# Patient Record
Sex: FEMALE | Race: WHITE | Employment: FULL TIME | URBAN - METROPOLITAN AREA
[De-identification: names, ages, dates, MRNs, and addresses within clinical notes are randomized per-mention and may not be internally consistent; named-entity substitution may affect disease eponyms.]

---

## 2018-01-26 ENCOUNTER — HOSPITAL ENCOUNTER (EMERGENCY)
Facility: CLINIC | Age: 53
Discharge: HOME | End: 2018-01-26
Attending: PHYSICIAN ASSISTANT | Admitting: PHYSICIAN ASSISTANT

## 2018-01-26 ENCOUNTER — APPOINTMENT (OUTPATIENT)
Dept: CT IMAGING | Facility: CLINIC | Age: 53
End: 2018-01-26
Attending: PHYSICIAN ASSISTANT

## 2018-01-26 VITALS
HEIGHT: 69 IN | OXYGEN SATURATION: 98 % | WEIGHT: 170 LBS | TEMPERATURE: 98 F | HEART RATE: 65 BPM | SYSTOLIC BLOOD PRESSURE: 142 MMHG | BODY MASS INDEX: 25.18 KG/M2 | RESPIRATION RATE: 16 BRPM | DIASTOLIC BLOOD PRESSURE: 57 MMHG

## 2018-01-26 DIAGNOSIS — S16.1XXA NECK STRAIN, INITIAL ENCOUNTER: ICD-10-CM

## 2018-01-26 DIAGNOSIS — S09.90XA CLOSED HEAD INJURY, INITIAL ENCOUNTER: ICD-10-CM

## 2018-01-26 PROCEDURE — 72125 CT NECK SPINE W/O DYE: CPT

## 2018-01-26 PROCEDURE — 70450 CT HEAD/BRAIN W/O DYE: CPT

## 2018-01-26 PROCEDURE — 99284 EMERGENCY DEPT VISIT MOD MDM: CPT | Mod: 25

## 2018-01-26 ASSESSMENT — ENCOUNTER SYMPTOMS
WEAKNESS: 0
NECK PAIN: 1
HEADACHES: 1
PHOTOPHOBIA: 0
VOMITING: 0
DIZZINESS: 0
SPEECH DIFFICULTY: 0
LIGHT-HEADEDNESS: 0
COLOR CHANGE: 0
BACK PAIN: 0
NAUSEA: 0
WOUND: 0
NUMBNESS: 0
MYALGIAS: 0

## 2018-01-26 NOTE — ED AVS SNAPSHOT
Emergency Department    [address] [facility] [address]    Phone:  [phone]    Fax:  [phone]                                       La Saleem   MRN: [id number]    Department:   Emergency Department   Date of Visit:  1/26/2018           Patient Information     Date Of Birth          1965        Your diagnoses for this visit were:     Closed head injury, initial encounter     Neck strain, initial encounter        You were seen by Ambika Zaldivar PA-C.      Follow-up Information     Follow up with Jerman Russell MD In 3 days.    Specialty:  Family Practice    Why:  As needed    Contact information:    [address]  [address]  [facility] [address] [phone]          Follow up with  Emergency Department.    Specialty:  EMERGENCY MEDICINE    Why:  If symptoms worsen    Contact information:    [address] [facility] [id number] [phone]        Discharge Instructions       Discharge Instructions  Head Injury    You have been seen today for a head injury. Your evaluation included a history and physical examination. You may have had a CT (CAT) scan performed, though most head injuries do not require a scan. Based on this evaluation, your provider today does not feel that your head injury is serious.    Generally, every Emergency Department visit should have a follow-up clinic visit with either a primary or a specialty clinic/provider. Please follow-up as instructed by your emergency provider today.  Return to the Emergency Department if:    You are confused or you are not acting right.    Your headache gets worse or you start to have a really bad headache even with your recommended treatment plan.    You vomit (throw up) more than once.    You have a seizure.    You have trouble walking.    You have weakness or paralysis (cannot move) in an arm or a leg.    You have blood or fluid coming from your ears or nose.    You have new symptoms or  anything that worries you.    Sleeping:  It is okay for you to sleep, but someone should wake you up if instructed by your provider, and someone should check on you at your usual time to wake up.     Activity:    Do not drive for at least 24 hours.    Do not drive if you have dizzy spells or trouble concentrating, or remembering things.    Do not return to any contact sports until cleared by your regular provider.     MORE INFORMATION:    Concussion:  A concussion is a minor head injury that may cause temporary problems with the way the brain works. Although concussions are important, they are generally not an emergency or a reason that a person needs to be hospitalized. Some concussion symptoms include confusion, amnesia (forgetful), nausea (sick to your stomach) and vomiting (throwing up), dizziness, fatigue, memory or concentration problems, irritability and sleep problems. For most people, concussions are mild and temporary but some will have more severe and persistent symptoms that require on-going care and treatment.  CT Scans: Your evaluation today may have included a CT scan (CAT scan) to look for things like bleeding or a skull fracture (broken bone).  CT scans involve radiation and too many CT scans can cause serious health problems like cancer, especially in children.  Because of this, your provider may not have ordered a CT scan today if they think you are at low risk for a serious or life threatening problem.    If you were given a prescription for medicine here today, be sure to read all of the information (including the package insert) that comes with your prescription.  This will include important information about the medicine, its side effects, and any warnings that you need to know about.  The pharmacist who fills the prescription can provide more information and answer questions you may have about the medicine.  If you have questions or concerns that the pharmacist cannot address, please call or  return to the Emergency Department.     Remember that you can always come back to the Emergency Department if you are not able to see your regular provider in the amount of time listed above, if you get any new symptoms, or if there is anything that worries you.      Neck Sprain or Strain  A sudden force that causes turning or bending of the neck can cause sprain or strain. An example would be the force from a car accident. This can stretch or tear muscles called a strain. It can also stretch or tear ligaments called a sprain. Either of these can cause neck pain. Sometimes neck pain occurs after a simple awkward movement. In either case, muscle spasm is commonly present and contributes to the pain.     Unless you had a forceful physical injury (for example, a car accident or fall), X-rays are usually not ordered for the initial evaluation of neck pain. If pain continues and dose not respond to medical treatment, X-rays and other tests may be performed at a later time.  Home care    You may feel more soreness and spasm the first few days after the injury. Rest until symptoms begin to improve.    When lying down, use a comfortable pillow or a rolled towel that supports the head and keeps the spine in a neutral position. The position of the head should not be tilted forward or backward.    Apply an ice pack over the injured area for 15 to 20 minutes every 3 to 6 hours. You should do this for the first 24 to 48 hours. You can make an ice pack by filling a plastic bag that seals at the top with ice cubes and then wrapping it with a thin towel. After 48 hours, apply heat (warm shower or warm bath) for 15 to 20 minutes several times a day, or alternate ice and heat.    You may use over-the-counter pain medicine to control pain, unless another pain medicine was prescribed. If you have chronic liver or kidney disease or ever had a stomach ulcer or GI bleeding, talk with your healthcare provider before using these  medicines.    If a soft cervical collar was prescribed, it should be worn only for periods of increased pain. It should not be worn for more than 3 hours a day, or for a period longer than 1 to 2 weeks.  Follow-up care  Follow up with your healthcare provider as directed. Physical therapy may be needed.  Sometimes fractures don t show up on the first X-ray. Bruises and sprains can sometimes hurt as much as a fracture. These injuries can take time to heal completely. If your symptoms don t improve or they get worse, talk with your healthcare provider. You may need a repeat X-ray or other tests. If X-rays were taken, you will be told of any new findings that may affect your care.  Call 911  Call 911 if you have:    Neck swelling, difficulty or painful swallowing    Difficulty breathing    Chest pain  When to seek medical advice  Call your healthcare provider right away if any of these occur:    Pain becomes worse or spreads into your arms    Weakness or numbness in one or both arms  Date Last Reviewed: 11/19/2015 2000-2017 [employer]. [address] [place] [address]. All rights reserved. This information is not intended as a substitute for professional medical care. Always follow your healthcare professional's instructions.            24 Hour Appointment Hotline       To make an appointment at any [facility], call [phone] ([phone]). If you don't have a family doctor or clinic, we will help you find one. [facility] clinics are conveniently located to serve the needs of you and your family.             Review of your medicines      Our records show that you are taking the medicines listed below. If these are incorrect, please call your family doctor or clinic.        Dose / Directions Last dose taken    ibuprofen 200 MG tablet   Commonly known as:  ADVIL/MOTRIN   Dose:  200 mg        Take 200 mg by mouth every 4 hours as needed for mild pain   Refills:  0        METHIMAZOLE  PO        Refills:  0        MULTIVITAMIN ADULT PO        Take by mouth daily as needed   Refills:  0                Procedures and tests performed during your visit     CT Head w/o Contrast    Cervical spine CT w/o contrast      Orders Needing Specimen Collection     None      Pending Results     Date and Time Order Name Status Description    1/26/2018 1948 CT Head w/o Contrast Preliminary     1/26/2018 1948 Cervical spine CT w/o contrast Preliminary             Pending Culture Results     No orders found from 1/24/2018 to 1/27/2018.            Pending Results Instructions     If you had any lab results that were not finalized at the time of your Discharge, you can call the ED Lab Result RN at [phone]. You will be contacted by this team for any positive Lab results or changes in treatment. The nurses are available 7 days a week from 10A to 6:30P.  You can leave a message 24 hours per day and they will return your call.        Test Results From Your Hospital Stay        1/26/2018  9:28 PM      Narrative     CT CERVICAL SPINE WITHOUT CONTRAST   1/26/2018  9:02 PM     HISTORY: Shelf fell onto head and neck, assess for fracture, shift  fell onto head and neck.      TECHNIQUE: Axial images of the cervical spine were obtained without  intravenous contrast. Multiplanar reformations were performed.  Radiation dose for this scan was reduced using automated exposure  control, adjustment of the mA and/or kV according to patient size, or  iterative reconstruction technique.     COMPARISON: 6/3/2014    FINDINGS: There is no evidence of fracture.    Alignment: Normal.      Craniocervical junction: Normal.     C1-C2:  Normal.     C2-C3:  Normal disc, facet joints, spinal canal and neural foramina.     C3-C4:  Normal disc, facet joints, spinal canal and neural foramina.     C4-C5:  Normal disc, facet joints, spinal canal and neural foramina.     C5-C6:  Normal disc, facet joints, spinal canal and neural foramina.      C6-C7:   Normal disc, facet joints, spinal canal and neural foramina.      C7-T1:   Normal disc, facet joints, spinal canal and neural foramina.        Impression     IMPRESSION:  Normal CT scan of the cervical spine.         1/26/2018  9:16 PM      Narrative     CT SCAN OF THE HEAD WITHOUT CONTRAST   1/26/2018 9:02 PM     HISTORY: Heavy shelf hit head and neck.    TECHNIQUE: Axial images of the head and coronal reformations without  IV contrast material.  Radiation dose for this scan was reduced using  automated exposure control, adjustment of the mA and/or kV according  to patient size, or iterative reconstruction technique.    COMPARISON: 12/31/2003    FINDINGS:  The ventricles are normal in size, shape and configuration.   The brain parenchyma and subarachnoid spaces are normal. There is no  evidence of intracranial hemorrhage, mass, acute infarct or anomaly.     The visualized portions of the sinuses and mastoids appear normal.  There is no evidence of trauma.        Impression     IMPRESSION: Normal CT scan of the head.                Clinical Quality Measure: Blood Pressure Screening     Your blood pressure was checked while you were in the emergency department today. The last reading we obtained was  BP: 142/57 . Please read the guidelines below about what these numbers mean and what you should do about them.  If your systolic blood pressure (the top number) is less than 120 and your diastolic blood pressure (the bottom number) is less than 80, then your blood pressure is normal. There is nothing more that you need to do about it.  If your systolic blood pressure (the top number) is 120-139 or your diastolic blood pressure (the bottom number) is 80-89, your blood pressure may be higher than it should be. You should have your blood pressure rechecked within a year by a primary care provider.  If your systolic blood pressure (the top number) is 140 or greater or your diastolic blood pressure (the bottom number) is 90  "or greater, you may have high blood pressure. High blood pressure is treatable, but if left untreated over time it can put you at risk for heart attack, stroke, or kidney failure. You should have your blood pressure rechecked by a primary care provider within the next 4 weeks.  If your provider in the emergency department today gave you specific instructions to follow-up with your doctor or provider even sooner than that, you should follow that instruction and not wait for up to 4 weeks for your follow-up visit.        Thank you for choosing [facility]       Thank you for choosing [facility] for your care. Our goal is always to provide you with excellent care. Hearing back from our patients is one way we can continue to improve our services. Please take a few minutes to complete the written survey that you may receive in the mail after you visit with us. Thank you!        [employer] Information     [employer] lets you send messages to your doctor, view your test results, renew your prescriptions, schedule appointments and more. To sign up, go to [email or web address] . Click on \"Log in\" on the left side of the screen, which will take you to the Welcome page. Then click on \"Sign up Now\" on the right side of the page.     You will be asked to enter the access code listed below, as well as some personal information. Please follow the directions to create your username and password.     Your access code is: [email or web address]  Expires: 2018  9:41 PM     Your access code will  in 90 days. If you need help or a new code, please call your [facility] clinic or [phone].        Care EveryWhere ID     This is your Care EveryWhere ID. This could be used by other organizations to access your [facility] medical records  [id number]        Equal Access to Services     ELIO MARTINEZ AH: annelise Cabezas, jimmy blanton. So soledad " [phone].    ATENCIÓN: Si habla español, tiene a morgan disposición servicios gratuitos de asistencia lingüística. Llame al [phone].    We comply with applicable federal civil rights laws and [place] laws. We do not discriminate on the basis of race, color, national origin, age, disability, sex, sexual orientation, or gender identity.            After Visit Summary       This is your record. Keep this with you and show to your community pharmacist(s) and doctor(s) at your next visit.

## 2018-01-26 NOTE — ED AVS SNAPSHOT
Emergency Department    [address] [place] [address]    Phone:  [phone]    Fax:  [phone]                                       La Saleem   MRN: [id number]    Department:   Emergency Department   Date of Visit:  1/26/2018           After Visit Summary Signature Page     I have received my discharge instructions, and my questions have been answered. I have discussed any challenges I see with this plan with the nurse or doctor.    ..........................................................................................................................................  Patient/Patient Representative Signature      ..........................................................................................................................................  Patient Representative Print Name and Relationship to Patient    ..................................................               ................................................  Date                                            Time    ..........................................................................................................................................  Reviewed by Signature/Title    ...................................................              ..............................................  Date                                                            Time

## 2018-01-27 NOTE — DISCHARGE INSTRUCTIONS
Discharge Instructions  Head Injury    You have been seen today for a head injury. Your evaluation included a history and physical examination. You may have had a CT (CAT) scan performed, though most head injuries do not require a scan. Based on this evaluation, your provider today does not feel that your head injury is serious.    Generally, every Emergency Department visit should have a follow-up clinic visit with either a primary or a specialty clinic/provider. Please follow-up as instructed by your emergency provider today.  Return to the Emergency Department if:    You are confused or you are not acting right.    Your headache gets worse or you start to have a really bad headache even with your recommended treatment plan.    You vomit (throw up) more than once.    You have a seizure.    You have trouble walking.    You have weakness or paralysis (cannot move) in an arm or a leg.    You have blood or fluid coming from your ears or nose.    You have new symptoms or anything that worries you.    Sleeping:  It is okay for you to sleep, but someone should wake you up if instructed by your provider, and someone should check on you at your usual time to wake up.     Activity:    Do not drive for at least 24 hours.    Do not drive if you have dizzy spells or trouble concentrating, or remembering things.    Do not return to any contact sports until cleared by your regular provider.     MORE INFORMATION:    Concussion:  A concussion is a minor head injury that may cause temporary problems with the way the brain works. Although concussions are important, they are generally not an emergency or a reason that a person needs to be hospitalized. Some concussion symptoms include confusion, amnesia (forgetful), nausea (sick to your stomach) and vomiting (throwing up), dizziness, fatigue, memory or concentration problems, irritability and sleep problems. For most people, concussions are mild and temporary but some will have more  severe and persistent symptoms that require on-going care and treatment.  CT Scans: Your evaluation today may have included a CT scan (CAT scan) to look for things like bleeding or a skull fracture (broken bone).  CT scans involve radiation and too many CT scans can cause serious health problems like cancer, especially in children.  Because of this, your provider may not have ordered a CT scan today if they think you are at low risk for a serious or life threatening problem.    If you were given a prescription for medicine here today, be sure to read all of the information (including the package insert) that comes with your prescription.  This will include important information about the medicine, its side effects, and any warnings that you need to know about.  The pharmacist who fills the prescription can provide more information and answer questions you may have about the medicine.  If you have questions or concerns that the pharmacist cannot address, please call or return to the Emergency Department.     Remember that you can always come back to the Emergency Department if you are not able to see your regular provider in the amount of time listed above, if you get any new symptoms, or if there is anything that worries you.      Neck Sprain or Strain  A sudden force that causes turning or bending of the neck can cause sprain or strain. An example would be the force from a car accident. This can stretch or tear muscles called a strain. It can also stretch or tear ligaments called a sprain. Either of these can cause neck pain. Sometimes neck pain occurs after a simple awkward movement. In either case, muscle spasm is commonly present and contributes to the pain.     Unless you had a forceful physical injury (for example, a car accident or fall), X-rays are usually not ordered for the initial evaluation of neck pain. If pain continues and dose not respond to medical treatment, X-rays and other tests may be performed  at a later time.  Home care    You may feel more soreness and spasm the first few days after the injury. Rest until symptoms begin to improve.    When lying down, use a comfortable pillow or a rolled towel that supports the head and keeps the spine in a neutral position. The position of the head should not be tilted forward or backward.    Apply an ice pack over the injured area for 15 to 20 minutes every 3 to 6 hours. You should do this for the first 24 to 48 hours. You can make an ice pack by filling a plastic bag that seals at the top with ice cubes and then wrapping it with a thin towel. After 48 hours, apply heat (warm shower or warm bath) for 15 to 20 minutes several times a day, or alternate ice and heat.    You may use over-the-counter pain medicine to control pain, unless another pain medicine was prescribed. If you have chronic liver or kidney disease or ever had a stomach ulcer or GI bleeding, talk with your healthcare provider before using these medicines.    If a soft cervical collar was prescribed, it should be worn only for periods of increased pain. It should not be worn for more than 3 hours a day, or for a period longer than 1 to 2 weeks.  Follow-up care  Follow up with your healthcare provider as directed. Physical therapy may be needed.  Sometimes fractures don t show up on the first X-ray. Bruises and sprains can sometimes hurt as much as a fracture. These injuries can take time to heal completely. If your symptoms don t improve or they get worse, talk with your healthcare provider. You may need a repeat X-ray or other tests. If X-rays were taken, you will be told of any new findings that may affect your care.  Call 911  Call 911 if you have:    Neck swelling, difficulty or painful swallowing    Difficulty breathing    Chest pain  When to seek medical advice  Call your healthcare provider right away if any of these occur:    Pain becomes worse or spreads into your arms    Weakness or numbness in  one or both arms  Date Last Reviewed: 11/19/2015 2000-2017 [employer]. [address] [place] [address], [place] [address]. All rights reserved. This information is not intended as a substitute for professional medical care. Always follow your healthcare professional's instructions.

## 2018-01-27 NOTE — ED PROVIDER NOTES
History     Chief Complaint:  Head injury    HPI   La Saleem is a 52 year old female with a history of paroxysmal atrial fibrillation who presents with  to the ED for evaluation of a head injury. The patient reports that around 1600 this afternoon, she was leaning over to grab something in her desk when a shelf weighing about 10 pounds and stretching 4 feet long fell directly on the left occipital region of her head. She believes the shelf fell about 3 feet onto her. She reports blacking out for a brief second but denies falling or a loss of consciousness. She denies the shelf directly hitting her neck. She denies any bleeding from the head and does not believe there is a wound or laceration. Immediately after the injury she had tingling in bilateral arms but reports this has resolved and thinks it was due to her adernaline. Following the fall, she applied ice to her head and took Advil for the pain. C-collar was placed while in the waiting room but she reports being able to move her neck okay before placement. Here in the ED, she complains of a headache and neck pain/soreness. She denies any nausea, vomiting, numbness, weakness, ear or mouth drainage, vision changes, back pain, extremity pain, or any other symptoms. She is not taking any blood thinner medications.     Allergies:  Amoxicillin  Pepcid  Sulfa drugs     Medications:    Methimazole  Ibuprofen  Multiple vitamins-minerals     Past Medical History:    NSVT  Paroxysmal atrial fibrillation  Palpitations    Past Surgical History:    Cholecystectomy  Foot surgery  Ablation PVC  Hysterectomy  Knee surgery    Family History:    Cancer  Hypertension  Arrhythmia    Social History:  Smoking status: Never  Alcohol use: Rarely  Marital Status:       Review of Systems   HENT: Negative for ear discharge.    Eyes: Negative for photophobia and visual disturbance.   Gastrointestinal: Negative for nausea and vomiting.   Musculoskeletal: Positive for  "neck pain. Negative for back pain and myalgias.   Skin: Negative for color change and wound.   Neurological: Positive for headaches. Negative for dizziness, syncope, speech difficulty, weakness, light-headedness and numbness.   All other systems reviewed and are negative.    Physical Exam   Patient Vitals for the past 24 hrs:   BP Temp Temp src Pulse Resp SpO2 Height Weight   01/26/18 1906 142/57 98  F (36.7  C) Temporal 65 16 98 % 1.753 m (5' 9\") 77.1 kg (170 lb)     Physical Exam  General: Resting comfortably.  Alert and oriented.   Head:  The scalp, face, and head appear normal     Scalp hematoma noted to posterior scalp.    No laceration.     No step offs or palpable skull fractures.   Eyes:  The pupils are equal, round, and reactive to light     Extraocular muscles are intact    Conjunctivae and sclerae are normal    ENT:    The oropharynx is normal    Uvula is in the midline   Neck:  Normal range of motion    There is no rigidity noted    No lymphadenopathy    There is mild midline cervical spine pain/tenderness   CV:  Regular rate and rhythm     Normal S1/S2    No pathological murmur detected  Resp:  Lungs are clear to auscultation    Non-labored    No rales or wheezing   GI:  Abdomen is soft, non-distended    No rebound tenderness     Normal bowel sounds   MS:  Normal muscular tone   Skin:  No rash or acute skin lesions noted   Neuro: Speech is normal and fluent. Cranial nerves II-XII intact.  strength equal. Strength and sensation itntact in bilateral upper and lower extremities. Finger-nose-finger coordinated and equal bilateral. Heel shin smooth and equal bilateral. Alert and oriented to person, place, and time. GCS 15. No focal deficits.    Emergency Department Course     Imaging:  Radiographic findings were communicated with the patient and family who voiced understanding of the findings.    CT-scan Head w/o contrast:  IMPRESSION: Normal CT scan of the head.  Result per radiology.     CT-scan " "Cervical Spine w/o contrast:  IMPRESSION:  Normal CT scan of the cervical spine.  Result per radiology.     Emergency Department Course:  Past medical records, nursing notes, and vitals reviewed.  1937: I performed an exam of the patient and obtained history, as documented above. GCS 15.    The patient was sent for CT-scans while in the emergency department, findings above.    2129: I rechecked the patient. Findings and plan explained to the Patient. Patient discharged home with instructions regarding supportive care, medications, and reasons to return. The importance of close follow-up was reviewed.     Impression & Plan      Medical Decision Making:  La Saleem is a 52 year old female who presents with  to the ED for evaluation of a head injury. The pateint is vitally stable.She is neurologically normal. She states that she \"blacked out\" for a few seconds. The differential diagnosis includes skull fracture, epidural hematoma, subdural hematoma, intracerebral hemorrhage, and traumatic subarachnoid hemorrhage. I felt that a CT scan was warranted due to the nature of the injury. CT of the head and neck are negative for any acute abnormality. Additionally the pateint complained of neck pain and there was midline tenderness on exam. A broad differential was considered including sprain, strain, fracture, tendon rupture, nerve impingement/compromise, referred pain. CT C-spine imaging was obtained and was also negative. This is consistent with a strain of the neck. The patient felt much improved upon reexamination. Although unlikely with normal neuroimaging,  the patient/family understands the importance of returning to the ED for a repeat evaluation with the development of worrisome symptoms, in particular we discussed: headaches that get worse, increased drowsiness, strange behavior, repetitive speech, seizures, repeated vomiting, growing confusion, increased irritability, slurred speech, weakness or " numbness, and loss of responsiveness. Although rare, delayed CNS bleeds can occur, and the patient was notified of this.   Post concussive syndrome is also discussed. Brain rest and close PCP follow up was encouraged. The patient was provided with the [facility] Concussion Discharge Instructions. I have recommended follow up with PCP in 2-3 days. All questions were answered.       Diagnosis:    ICD-10-CM   1. Closed head injury, initial encounter S09.90XA   2. Neck strain, initial encounter S16.1XXA       Disposition:  discharged to home      Chantale Munira  1/26/2018    EMERGENCY DEPARTMENT  I, Chantale Munira, am serving as a scribe at 7:37 PM on 1/26/2018 to document services personally performed by Ambika Zaldivar PA-C based on my observations and the provider's statements to me.        Ambika Zaldivar PA-C  01/26/18 [id number]